# Patient Record
Sex: FEMALE | Race: WHITE | NOT HISPANIC OR LATINO
[De-identification: names, ages, dates, MRNs, and addresses within clinical notes are randomized per-mention and may not be internally consistent; named-entity substitution may affect disease eponyms.]

---

## 2019-02-05 PROBLEM — Z00.00 ENCOUNTER FOR PREVENTIVE HEALTH EXAMINATION: Status: ACTIVE | Noted: 2019-02-05

## 2022-10-15 ENCOUNTER — TRANSCRIPTION ENCOUNTER (OUTPATIENT)
Age: 87
End: 2022-10-15

## 2022-10-27 DIAGNOSIS — R52 PAIN, UNSPECIFIED: ICD-10-CM

## 2022-10-27 RX ORDER — MORPHINE SULFATE 10 MG/5ML
10 SOLUTION ORAL
Qty: 75 | Refills: 0 | Status: ACTIVE | COMMUNITY
Start: 2022-10-27 | End: 1900-01-01

## 2022-10-27 RX ORDER — LORAZEPAM 2 MG/ML
2 SOLUTION, CONCENTRATE ORAL
Qty: 15 | Refills: 0 | Status: ACTIVE | COMMUNITY
Start: 2022-10-27 | End: 1900-01-01

## 2022-10-28 ENCOUNTER — TRANSCRIPTION ENCOUNTER (OUTPATIENT)
Age: 87
End: 2022-10-28

## 2022-11-03 ENCOUNTER — APPOINTMENT (OUTPATIENT)
Dept: GERIATRICS | Facility: HOME HEALTH | Age: 87
End: 2022-11-03

## 2022-11-03 VITALS — RESPIRATION RATE: 18 BRPM | TEMPERATURE: 98.7 F | HEART RATE: 68 BPM | OXYGEN SATURATION: 94 %

## 2022-11-03 DIAGNOSIS — Z74.01 BED CONFINEMENT STATUS: ICD-10-CM

## 2022-11-03 DIAGNOSIS — R47.01 APHASIA: ICD-10-CM

## 2022-11-03 DIAGNOSIS — F03.90 UNSPECIFIED DEMENTIA W/OUT BEHAVIORAL DISTURBANCE: ICD-10-CM

## 2022-11-03 DIAGNOSIS — I61.9 NONTRAUMATIC INTRACEREBRAL HEMORRHAGE, UNSPECIFIED: ICD-10-CM

## 2022-11-03 DIAGNOSIS — R13.10 DYSPHAGIA, UNSPECIFIED: ICD-10-CM

## 2022-11-03 PROCEDURE — 99344 HOME/RES VST NEW MOD MDM 60: CPT

## 2022-11-03 NOTE — ASSESSMENT
[FreeTextEntry1] : 89 y/o F w/ advanced dementia FAST 7D, recent ICH\par - Aspiration precautions discussed at length with SHANI, RN, and HCP\par - To be enrolled in home hospice tomorrow

## 2022-11-03 NOTE — HISTORY OF PRESENT ILLNESS
[FreeTextEntry1] : 89-year-old female patient with a past medical history of TIA, PAF not on anticoagulation, dementia, hypertension, multiple urinary tract infection, recently hospitalized for UTI 10/13-10/15 presented to the ER on 10/25 for increased lethargy and congestion and pneumonia. Patient has been bed bound and non ambulatory for 3 years now, is non verbal at baseline.  She was noted not moving her left arm and left leg about 2 weeks ago, then noticed with increasing lethargy, increased cough production with poor po intake last few days. The chest x-ray at home showed pneumonia - family called EMS brought to the ED for evaluation - CTH showed right basal ganglia hemorrhage, and she was ultimately discharged on home hospice.\par \par Home hospice to admit tomorrow. \par \par Seen at home with SHANI Deleon and HAN Perkins. Spoke to NAHID Michel over the phone.\par Seems to be almost back to baseline since the admission. Says one word occasionally, otherwise mostly sleeps. Does passive PT 3x/week and works with an alternative healer weekly. \par \par Eating well, coughs and sounds wet afterwards. Clears after a while and with chest PT. \par \par Anand Rodriguez ([facility]): [phone]\par Adrienne ([facility])\par Maddie Wall (RN):- [phone]\par Trey Graf (), [phone]

## 2022-11-03 NOTE — PHYSICAL EXAM
[General Appearance - In No Acute Distress] : in no acute distress [No Oral Pallor] : no oral pallor [Neck Appearance] : the appearance of the neck was normal [] : no respiratory distress [Respiration, Rhythm And Depth] : normal respiratory rhythm and effort [Exaggerated Use Of Accessory Muscles For Inspiration] : no accessory muscle use [Auscultation Breath Sounds / Voice Sounds] : lungs were clear to auscultation bilaterally [Heart Sounds] : normal S1 and S2 [Edema] : there was no peripheral edema [Bowel Sounds] : normal bowel sounds [Abdomen Soft] : soft [Abdomen Tenderness] : non-tender [FreeTextEntry1] : Unresponsive, does nto follow commands